# Patient Record
Sex: FEMALE | Race: ASIAN | NOT HISPANIC OR LATINO
[De-identification: names, ages, dates, MRNs, and addresses within clinical notes are randomized per-mention and may not be internally consistent; named-entity substitution may affect disease eponyms.]

---

## 2018-04-19 NOTE — PATIENT DISCUSSION
Pt edu stable, no blood or fluid seen on OCT/Exam. Discussed in detail re: nature of condition, dry vs wet AMD, AREDS.

## 2018-10-12 NOTE — PATIENT DISCUSSION
Pt edu still remaining stable, no blood or fluid seen on OCT/Exam. Discussed in detail re: nature of condition, dry vs wet AMD.

## 2020-01-30 PROBLEM — Z98.890: Noted: 2020-01-30

## 2020-05-27 NOTE — PATIENT DISCUSSION
Patient was call and was scheduled for May 2021 - 1 year Follow Up   Patient was transferred to pre service to schedule MRI Prior to appt,    Retinal tear and detachment warning symptoms reviewed and patient instructed to call immediately if increasing floaters, flashes, or decreasing peripheral vision.

## 2021-06-09 ENCOUNTER — NEW PATIENT ROUTINE/VISION (OUTPATIENT)
Dept: URBAN - METROPOLITAN AREA CLINIC 48 | Facility: CLINIC | Age: 61
End: 2021-06-09

## 2021-06-09 DIAGNOSIS — H43.813: ICD-10-CM

## 2021-06-09 DIAGNOSIS — Z01.01: ICD-10-CM

## 2021-06-09 DIAGNOSIS — H52.4: ICD-10-CM

## 2021-06-09 DIAGNOSIS — H35.373: ICD-10-CM

## 2021-06-09 PROCEDURE — 92134 CPTRZ OPH DX IMG PST SGM RTA: CPT

## 2021-06-09 PROCEDURE — CLF EW SOF CL FIT, EW, SOFT, SPH

## 2021-06-09 PROCEDURE — 92004 COMPRE OPH EXAM NEW PT 1/>: CPT

## 2021-06-09 ASSESSMENT — VISUAL ACUITY
OU_SC: 20/400+1
OS_PH: 20/30-1
OS_SC: 20/200
OD_SC: 20/400

## 2021-06-09 ASSESSMENT — TONOMETRY
OS_IOP_MMHG: 14
OD_IOP_MMHG: 13

## 2021-06-09 NOTE — PATIENT DISCUSSION
H/o RT s/p laser. Followed by [facility]. Patient educated on signs/symptoms of RD and to rtc STAT if these occur. Continue f/u care with [facility].

## 2021-06-09 NOTE — PATIENT DISCUSSION
Will order CL trials for patient to try. Finalized CL Rx with updated ORx. Patient to call if unhappy with trials.